# Patient Record
Sex: FEMALE | Race: OTHER | NOT HISPANIC OR LATINO | URBAN - METROPOLITAN AREA
[De-identification: names, ages, dates, MRNs, and addresses within clinical notes are randomized per-mention and may not be internally consistent; named-entity substitution may affect disease eponyms.]

---

## 2017-09-01 ENCOUNTER — EMERGENCY (EMERGENCY)
Facility: HOSPITAL | Age: 37
LOS: 1 days | Discharge: HOME | End: 2017-09-01
Attending: EMERGENCY MEDICINE | Admitting: EMERGENCY MEDICINE
Payer: COMMERCIAL

## 2017-09-01 VITALS — SYSTOLIC BLOOD PRESSURE: 164 MMHG | HEART RATE: 83 BPM | RESPIRATION RATE: 16 BRPM | DIASTOLIC BLOOD PRESSURE: 114 MMHG

## 2017-09-01 VITALS
DIASTOLIC BLOOD PRESSURE: 142 MMHG | OXYGEN SATURATION: 100 % | RESPIRATION RATE: 14 BRPM | HEIGHT: 62 IN | WEIGHT: 147.93 LBS | SYSTOLIC BLOOD PRESSURE: 208 MMHG | HEART RATE: 85 BPM | TEMPERATURE: 98 F

## 2017-09-01 DIAGNOSIS — Z90.49 ACQUIRED ABSENCE OF OTHER SPECIFIED PARTS OF DIGESTIVE TRACT: Chronic | ICD-10-CM

## 2017-09-01 DIAGNOSIS — Z98.891 HISTORY OF UTERINE SCAR FROM PREVIOUS SURGERY: Chronic | ICD-10-CM

## 2017-09-01 LAB
ALBUMIN SERPL ELPH-MCNC: 3.9 G/DL — SIGNIFICANT CHANGE UP (ref 3.3–5)
ALP SERPL-CCNC: 83 U/L — SIGNIFICANT CHANGE UP (ref 40–120)
ALT FLD-CCNC: 22 U/L — SIGNIFICANT CHANGE UP (ref 12–78)
ANION GAP SERPL CALC-SCNC: 10 MMOL/L — SIGNIFICANT CHANGE UP (ref 5–17)
AST SERPL-CCNC: 16 U/L — SIGNIFICANT CHANGE UP (ref 15–37)
BASOPHILS # BLD AUTO: 0.1 K/UL — SIGNIFICANT CHANGE UP (ref 0–0.2)
BASOPHILS NFR BLD AUTO: 1.4 % — SIGNIFICANT CHANGE UP (ref 0–2)
BILIRUB SERPL-MCNC: 0.2 MG/DL — SIGNIFICANT CHANGE UP (ref 0.2–1.2)
BUN SERPL-MCNC: 16 MG/DL — SIGNIFICANT CHANGE UP (ref 7–23)
CALCIUM SERPL-MCNC: 8.9 MG/DL — SIGNIFICANT CHANGE UP (ref 8.5–10.1)
CHLORIDE SERPL-SCNC: 103 MMOL/L — SIGNIFICANT CHANGE UP (ref 96–108)
CK SERPL-CCNC: 121 U/L — SIGNIFICANT CHANGE UP (ref 26–192)
CO2 SERPL-SCNC: 25 MMOL/L — SIGNIFICANT CHANGE UP (ref 22–31)
CREAT SERPL-MCNC: 0.73 MG/DL — SIGNIFICANT CHANGE UP (ref 0.5–1.3)
EOSINOPHIL # BLD AUTO: 0.3 K/UL — SIGNIFICANT CHANGE UP (ref 0–0.5)
EOSINOPHIL NFR BLD AUTO: 4.2 % — SIGNIFICANT CHANGE UP (ref 0–6)
GLUCOSE SERPL-MCNC: 93 MG/DL — SIGNIFICANT CHANGE UP (ref 70–99)
HBA1C BLD-MCNC: 5.9 % — HIGH (ref 4–5.6)
HCG SERPL-ACNC: <1 MIU/ML — SIGNIFICANT CHANGE UP
HCT VFR BLD CALC: 39.1 % — SIGNIFICANT CHANGE UP (ref 34.5–45)
HGB BLD-MCNC: 13.3 G/DL — SIGNIFICANT CHANGE UP (ref 11.5–15.5)
INR BLD: 0.95 RATIO — SIGNIFICANT CHANGE UP (ref 0.88–1.16)
LYMPHOCYTES # BLD AUTO: 1.8 K/UL — SIGNIFICANT CHANGE UP (ref 1–3.3)
LYMPHOCYTES # BLD AUTO: 24.2 % — SIGNIFICANT CHANGE UP (ref 13–44)
MCHC RBC-ENTMCNC: 28.8 PG — SIGNIFICANT CHANGE UP (ref 27–34)
MCHC RBC-ENTMCNC: 34 GM/DL — SIGNIFICANT CHANGE UP (ref 32–36)
MCV RBC AUTO: 84.6 FL — SIGNIFICANT CHANGE UP (ref 80–100)
MONOCYTES # BLD AUTO: 0.5 K/UL — SIGNIFICANT CHANGE UP (ref 0–0.9)
MONOCYTES NFR BLD AUTO: 6.2 % — SIGNIFICANT CHANGE UP (ref 1–9)
NEUTROPHILS # BLD AUTO: 4.9 K/UL — SIGNIFICANT CHANGE UP (ref 1.8–7.4)
NEUTROPHILS NFR BLD AUTO: 64 % — SIGNIFICANT CHANGE UP (ref 43–77)
PLATELET # BLD AUTO: 240 K/UL — SIGNIFICANT CHANGE UP (ref 150–400)
POTASSIUM SERPL-MCNC: 3.9 MMOL/L — SIGNIFICANT CHANGE UP (ref 3.5–5.3)
POTASSIUM SERPL-SCNC: 3.9 MMOL/L — SIGNIFICANT CHANGE UP (ref 3.5–5.3)
PROT SERPL-MCNC: 8 G/DL — SIGNIFICANT CHANGE UP (ref 6–8.3)
PROTHROM AB SERPL-ACNC: 10.3 SEC — SIGNIFICANT CHANGE UP (ref 9.8–12.7)
RBC # BLD: 4.62 M/UL — SIGNIFICANT CHANGE UP (ref 3.8–5.2)
RBC # FLD: 11.6 % — SIGNIFICANT CHANGE UP (ref 10.3–14.5)
SODIUM SERPL-SCNC: 138 MMOL/L — SIGNIFICANT CHANGE UP (ref 135–145)
TROPONIN I SERPL-MCNC: <.015 NG/ML — SIGNIFICANT CHANGE UP (ref 0.01–0.04)
WBC # BLD: 7.6 K/UL — SIGNIFICANT CHANGE UP (ref 3.8–10.5)
WBC # FLD AUTO: 7.6 K/UL — SIGNIFICANT CHANGE UP (ref 3.8–10.5)

## 2017-09-01 PROCEDURE — 85027 COMPLETE CBC AUTOMATED: CPT

## 2017-09-01 PROCEDURE — 85610 PROTHROMBIN TIME: CPT

## 2017-09-01 PROCEDURE — 93005 ELECTROCARDIOGRAM TRACING: CPT

## 2017-09-01 PROCEDURE — 82550 ASSAY OF CK (CPK): CPT

## 2017-09-01 PROCEDURE — 83036 HEMOGLOBIN GLYCOSYLATED A1C: CPT

## 2017-09-01 PROCEDURE — 84484 ASSAY OF TROPONIN QUANT: CPT

## 2017-09-01 PROCEDURE — 80053 COMPREHEN METABOLIC PANEL: CPT

## 2017-09-01 PROCEDURE — 36415 COLL VENOUS BLD VENIPUNCTURE: CPT

## 2017-09-01 PROCEDURE — 84702 CHORIONIC GONADOTROPIN TEST: CPT

## 2017-09-01 PROCEDURE — 99283 EMERGENCY DEPT VISIT LOW MDM: CPT | Mod: 25

## 2017-09-01 PROCEDURE — 99283 EMERGENCY DEPT VISIT LOW MDM: CPT

## 2017-09-01 RX ORDER — LOSARTAN POTASSIUM 100 MG/1
50 TABLET, FILM COATED ORAL ONCE
Qty: 0 | Refills: 0 | Status: DISCONTINUED | OUTPATIENT
Start: 2017-09-01 | End: 2017-09-01

## 2017-09-01 RX ORDER — LOSARTAN POTASSIUM 100 MG/1
50 TABLET, FILM COATED ORAL ONCE
Qty: 0 | Refills: 0 | Status: DISCONTINUED | OUTPATIENT
Start: 2017-09-01 | End: 2017-09-05

## 2017-09-01 RX ORDER — LOSARTAN POTASSIUM 100 MG/1
1 TABLET, FILM COATED ORAL
Qty: 30 | Refills: 0 | OUTPATIENT
Start: 2017-09-01 | End: 2017-10-01

## 2017-09-01 RX ADMIN — LOSARTAN POTASSIUM 50 MILLIGRAM(S): 100 TABLET, FILM COATED ORAL at 17:19

## 2017-09-01 NOTE — ED PROVIDER NOTE - PROGRESS NOTE DETAILS
Patient feeling well, no chest pain, no shortness of breath, no headache, labs, ekg reviwed, no acute findings, agrees to take medication and keep a log of her blood pressure, will make appointment with her PMD for follow up, understands to return to ER for chest pain, shortness of breath or blood pressure systolic > 200

## 2017-09-01 NOTE — ED PROVIDER NOTE - FAMILY HISTORY
Mother  Still living? Yes, Estimated age: Age Unknown  Family history of hypertension in mother, Age at diagnosis: Age Unknown  Family history of diabetes mellitus (DM), Age at diagnosis: Age Unknown

## 2017-09-01 NOTE — ED PROVIDER NOTE - OBJECTIVE STATEMENT
38yo F with no PMH presents to the ED with cc of hypertension. Patient was sent over by Urgent care after BP reading of 178/117 and 216/137 during a routine immigration physical exam. Patient admits to intermittent dizziness for the past week, increased thirst and increased urination. Denies N/V, changes in vision, changes in hearing, SOB, chest pain, palpitations, abdominal pain, or edema.

## 2017-09-01 NOTE — ED ADULT NURSE NOTE - OBJECTIVE STATEMENT
pt to er states her blood pressure ha s been elevated upon arrival is alert oriented speech clear lungs clear resp even unlabored skin intact warm and dry iv started 20 angio left ac

## 2017-09-03 ENCOUNTER — EMERGENCY (EMERGENCY)
Facility: HOSPITAL | Age: 37
LOS: 1 days | Discharge: HOME | End: 2017-09-03
Attending: EMERGENCY MEDICINE | Admitting: EMERGENCY MEDICINE
Payer: COMMERCIAL

## 2017-09-03 VITALS
RESPIRATION RATE: 12 BRPM | HEART RATE: 82 BPM | OXYGEN SATURATION: 100 % | SYSTOLIC BLOOD PRESSURE: 180 MMHG | WEIGHT: 147.93 LBS | HEIGHT: 62 IN | DIASTOLIC BLOOD PRESSURE: 125 MMHG | TEMPERATURE: 99 F

## 2017-09-03 VITALS
SYSTOLIC BLOOD PRESSURE: 168 MMHG | RESPIRATION RATE: 15 BRPM | OXYGEN SATURATION: 99 % | HEART RATE: 74 BPM | DIASTOLIC BLOOD PRESSURE: 88 MMHG

## 2017-09-03 DIAGNOSIS — Z98.891 HISTORY OF UTERINE SCAR FROM PREVIOUS SURGERY: Chronic | ICD-10-CM

## 2017-09-03 DIAGNOSIS — Z90.49 ACQUIRED ABSENCE OF OTHER SPECIFIED PARTS OF DIGESTIVE TRACT: Chronic | ICD-10-CM

## 2017-09-03 PROCEDURE — 99285 EMERGENCY DEPT VISIT HI MDM: CPT

## 2017-09-03 PROCEDURE — 93005 ELECTROCARDIOGRAM TRACING: CPT

## 2017-09-03 PROCEDURE — 99284 EMERGENCY DEPT VISIT MOD MDM: CPT | Mod: 25

## 2017-09-03 PROCEDURE — 70450 CT HEAD/BRAIN W/O DYE: CPT | Mod: 26

## 2017-09-03 PROCEDURE — 93010 ELECTROCARDIOGRAM REPORT: CPT

## 2017-09-03 PROCEDURE — 70450 CT HEAD/BRAIN W/O DYE: CPT

## 2017-09-03 RX ORDER — HYDRALAZINE HCL 50 MG
50 TABLET ORAL ONCE
Qty: 0 | Refills: 0 | Status: COMPLETED | OUTPATIENT
Start: 2017-09-03 | End: 2017-09-03

## 2017-09-03 RX ADMIN — Medication 50 MILLIGRAM(S): at 18:07

## 2017-09-03 NOTE — CONSULT NOTE ADULT - SUBJECTIVE AND OBJECTIVE BOX
History of Present Illness: The patient is a 37 year old female with a history of HTN who was sent in from Urgent Care for hypertensive urgency. She was seen on Friday, noted to be very hypertensive, sent to ED. She was started on losartan 50 mg daily. She returned to Urgent Care today for follow-up, noted again to be significantly hypertensive and sent back to ED. She notes mild headache but otherwise denies chest pain, shortness of breath, palpitations. She had seen her gyn about 2 weeks ago, she was hypertensive then, told to see her PMD. She notes she snores at night.    Past Medical/Surgical History:  HTN    Medications:  Losartan 50 mg daily    Family History: Non-contributory family history of premature cardiovascular atherosclerotic disease    Social History: No tobacco, alcohol or drug use    Review of Systems:  General: No fevers, chills, weight loss or gain  Skin: No rashes, color changes  Cardiovascular: No chest pain, orthopnea  Respiratory: No shortness of breath, cough  Gastrointestinal: No nausea, abdominal pain  Genitourinary: No incontinence, pain with urination  Musculoskeletal: No pain, swelling, decreased range of motion  Neurological: No headache, weakness  Psychiatric: No depression, anxiety  Endocrine: No weight loss or gain, increased thirst  All other systems are comprehensively negative.    Physical Exam:  Vitals:        Vital Signs Last 24 Hrs  T(C): 37 (03 Sep 2017 15:46), Max: 37 (03 Sep 2017 15:46)  T(F): 98.6 (03 Sep 2017 15:46), Max: 98.6 (03 Sep 2017 15:46)  HR: 78 (03 Sep 2017 16:14) (78 - 82)  BP: 178/98 (03 Sep 2017 16:14) (178/98 - 183/121)  BP(mean): --  RR: 14 (03 Sep 2017 16:14) (12 - 14)  SpO2: 99% (03 Sep 2017 16:14) (99% - 100%)  General: NAD  Neck: No JVD, no carotid bruit  Lungs: CTAB  CV: RRR, nl S1/S2, no M/R/G  Abdomen: S/NT/ND, +BS  Extremities: No LE edema, no cyanosis    Labs:    ECG: NSR, normal axis, borderline LVH

## 2017-09-03 NOTE — ED PROVIDER NOTE - PROGRESS NOTE DETAILS
seen and evaluated by cardiology Dr. Syd Huang, to get dose of hydralizine in ER, d/c home, to increase losartan to 100mg once daily and will f/u in office, patient understands and will make appointment, agrees to return to ER if having chest pain, vomiting, difficulty breathing or worsening of symptoms

## 2017-09-03 NOTE — ED PROVIDER NOTE - OBJECTIVE STATEMENT
37 female sent to ER from urgent care for evaluation of elevated blood pressure. Patient was in ER 2 days ago for same, started on losartan, has been taking it as prescribed. Patient states she had slight head pressure and blurry vision which has resolved.

## 2017-09-03 NOTE — CONSULT NOTE ADULT - ASSESSMENT
The patient is a 37 year old female with a history of HTN who presents with hypertensive urgency.    Plan:  - Give hydralazine 50 mg PO now  - Increase losartan to 100 mg daily. She will likely need additional BP agents.  - CT head negative for acute pathology  - Given patient is asymptomatic, ok to be discharged from a cardiac perspective. Given her age, secondary causes of hypertension should be evaluated, such as sleep apnea. She will follow-up in the office within one week.

## 2017-09-03 NOTE — ED ADULT NURSE NOTE - OBJECTIVE STATEMENT
Patient sent in from Urgent Care for hypertension.  She was seen here for the same on Sept. 1st.  Dr. Arie walls.

## 2017-09-05 DIAGNOSIS — I10 ESSENTIAL (PRIMARY) HYPERTENSION: ICD-10-CM

## 2017-09-07 DIAGNOSIS — I10 ESSENTIAL (PRIMARY) HYPERTENSION: ICD-10-CM

## 2022-08-18 NOTE — ED PROVIDER NOTE - SKIN NEGATIVE STATEMENT, MLM
-- DO NOT REPLY / DO NOT REPLY ALL --  -- Message is from [facility]agement Center [employer] ([facility]) --    ONLY TO BE USED WITHIN A REFILL MEDICATION ENCOUNTER    Med Refill  Is the patient currently having Emergent symptoms?: No    Has patient contacted the pharmacy? Yes    Is this the first request for the medication in the last 48 hours?: Yes      Patient is requesting a medication refill - medication is on active list      Full name of the provider who ordered the medication: Torrie Amaro    [facility] site name / Account # for provider: [place]    Preferred Pharmacy: Pharmacy  [id number] - [place], [place] - [address] [facility]    Patient confirmed the above pharmacy as correct?  Yes      Caller Information       Type Contact Phone/Fax    08/18/2022 12:57 PM CDT Phone (Incoming) Naomi Johnson (Self) [phone] (H)          Alternative phone number: none    Can a detailed message be left?: Yes    Patient is completely out of medication: verify if patient is currently experiencing symptoms. If patient is symptomatic, proceed with front end triage instead of medication refill. If patient is not symptomatic but is completely out of medication, mora as High priority when routing. Inform patient: “Please call back with any questions or concerns and if your condition becomes life threatening, you should seek immediate medical assistance by calling 911 or going to the Emergency Department for evaluation.”    Inform all patients: \"Please be aware the return phone call may come from an unidentified phone number and your refill request will be addressed as soon as the clinical team reviews your message.\"   no abrasions, no jaundice, no lesions, no pruritis, and no rashes.

## 2024-01-01 NOTE — ED ADULT NURSE NOTE - RN DISCHARGE SIGNATURE
For information on Fall & Injury Prevention, visit: [email or web address] OR  [email or web address] OR  https://www.cdc.gov/steadi/patient.html For information on Fall & Injury Prevention, visit: [email or web address] OR  [email or web address] OR  https://www.cdc.gov/steadi/patient.html 01-Sep-2017